# Patient Record
Sex: MALE | Race: WHITE | Employment: OTHER | URBAN - METROPOLITAN AREA
[De-identification: names, ages, dates, MRNs, and addresses within clinical notes are randomized per-mention and may not be internally consistent; named-entity substitution may affect disease eponyms.]

---

## 2017-01-01 ENCOUNTER — OFFICE VISIT (OUTPATIENT)
Dept: HEMATOLOGY/ONCOLOGY | Facility: HOSPITAL | Age: 82
End: 2017-01-01
Attending: INTERNAL MEDICINE
Payer: MEDICARE

## 2017-01-01 ENCOUNTER — TELEPHONE (OUTPATIENT)
Dept: HEMATOLOGY/ONCOLOGY | Facility: HOSPITAL | Age: 82
End: 2017-01-01

## 2017-01-01 ENCOUNTER — SOCIAL WORK SERVICES (OUTPATIENT)
Dept: HEMATOLOGY/ONCOLOGY | Facility: HOSPITAL | Age: 82
End: 2017-01-01

## 2017-01-01 ENCOUNTER — HOSPITAL ENCOUNTER (OUTPATIENT)
Dept: NUCLEAR MEDICINE | Facility: HOSPITAL | Age: 82
Discharge: HOME | End: 2017-01-01
Attending: INTERNAL MEDICINE
Payer: MEDICARE

## 2017-01-01 DIAGNOSIS — C15.5 MALIGNANT NEOPLASM OF LOWER THIRD OF ESOPHAGUS (HCC): Primary | ICD-10-CM

## 2017-01-01 DIAGNOSIS — C78.1: ICD-10-CM

## 2017-01-01 DIAGNOSIS — C15.5 MALIGNANT NEOPLASM OF LOWER THIRD OF ESOPHAGUS (HCC): ICD-10-CM

## 2017-01-01 DIAGNOSIS — C79.89 SECONDARY MALIGNANCY OF SOFT TISSUE (HCC): ICD-10-CM

## 2017-01-01 DIAGNOSIS — C15.9 MALIGNANT NEOPLASM OF ESOPHAGUS, UNSPECIFIED LOCATION (HCC): ICD-10-CM

## 2017-01-01 LAB — GLUCOSE BLD-MCNC: 87 MG/DL (ref 65–99)

## 2017-01-01 PROCEDURE — 78815 PET IMAGE W/CT SKULL-THIGH: CPT

## 2017-01-01 PROCEDURE — 82962 GLUCOSE BLOOD TEST: CPT

## 2017-01-01 RX ORDER — METOCLOPRAMIDE 10 MG/1
TABLET ORAL
Qty: 60 TABLET | Refills: 0 | Status: SHIPPED | OUTPATIENT
Start: 2017-01-01

## 2017-01-01 RX ORDER — ONDANSETRON HYDROCHLORIDE 8 MG/1
TABLET, FILM COATED ORAL
Qty: 20 TABLET | Status: SHIPPED | OUTPATIENT
Start: 2017-01-01

## 2017-01-01 RX ORDER — ONDANSETRON 8 MG/1
8 TABLET, ORALLY DISINTEGRATING ORAL EVERY 8 HOURS PRN
Qty: 30 TABLET | Refills: 0 | Status: CANCELLED | OUTPATIENT
Start: 2017-01-01 | End: 2017-01-01

## 2017-01-05 NOTE — TELEPHONE ENCOUNTER
Spoke to daughter regarding PET results which show dramatic progression. Not a candidate for systemic treatment. Has new R pleural effusion. According to her SOB with exertion but not at rest.  Will come in on the 10th to discuss palliative care issues.

## 2017-01-09 NOTE — TELEPHONE ENCOUNTER
Patient's daughter Jb Singh called today. Recent PET unfortunate progression. He is now living with his daughter, he has dementia and more confusion. She has decided not to tell him. She will come to meet with Dr. Sharita Jerez by herself.

## 2017-01-10 NOTE — PROGRESS NOTES
YING met with patient's daughter Lacie Macedo and discussed hospice. At her request, referral for consult only faxed to [facility] and [facility].

## 2017-01-10 NOTE — PROGRESS NOTES
Daughter is here to meet with Dr Po Dexter regarding patient's condition. Patient has declined over the past week. Pt is very weak and fatigued. Decrease appetite. Daughter states he doesn't want to get up to move around. PET/Ct was done on 1/5.          Orin Presley

## 2017-01-11 NOTE — PROGRESS NOTES
Met with daughter without patient. Son was on the phone. Reviewed the PET results and showed images. Given extent of disease, poor PS and elderly age, I recommended against chemo and recommended for palliative care and hospice.   Social work met with the

## 2017-02-20 ENCOUNTER — TELEPHONE (OUTPATIENT)
Dept: HEMATOLOGY/ONCOLOGY | Facility: HOSPITAL | Age: 82
End: 2017-02-20

## 2017-02-20 NOTE — TELEPHONE ENCOUNTER
Left message on daughter's number expressing our condolences and asked her to call us if she needed anything.   DILLAN Villar

## 2017-06-07 NOTE — MR AVS SNAPSHOT
After Visit Summary   1/10/2017    Beltran Kevin    MRN: [id number]           Diagnoses this Visit     Malignant neoplasm of lower third of esophagus ([facility] [address]    -  Primary       Allergies     No Known Allergies      Your Vital Signs Were     Smoking St
AMS
